# Patient Record
Sex: MALE | Race: OTHER | NOT HISPANIC OR LATINO | URBAN - METROPOLITAN AREA
[De-identification: names, ages, dates, MRNs, and addresses within clinical notes are randomized per-mention and may not be internally consistent; named-entity substitution may affect disease eponyms.]

---

## 2018-02-13 ENCOUNTER — EMERGENCY (EMERGENCY)
Facility: HOSPITAL | Age: 43
LOS: 1 days | Discharge: HOME | End: 2018-02-13
Admitting: EMERGENCY MEDICINE
Payer: MEDICAID

## 2018-02-13 VITALS
HEART RATE: 76 BPM | OXYGEN SATURATION: 100 % | TEMPERATURE: 98 F | DIASTOLIC BLOOD PRESSURE: 71 MMHG | RESPIRATION RATE: 18 BRPM | SYSTOLIC BLOOD PRESSURE: 113 MMHG

## 2018-02-13 PROCEDURE — 71046 X-RAY EXAM CHEST 2 VIEWS: CPT | Mod: 26

## 2018-02-13 PROCEDURE — 99284 EMERGENCY DEPT VISIT MOD MDM: CPT

## 2018-02-13 PROCEDURE — 73030 X-RAY EXAM OF SHOULDER: CPT | Mod: 26,RT

## 2018-02-13 RX ORDER — IBUPROFEN 200 MG
600 TABLET ORAL ONCE
Qty: 0 | Refills: 0 | Status: COMPLETED | OUTPATIENT
Start: 2018-02-13 | End: 2018-02-13

## 2018-02-13 RX ADMIN — Medication 600 MILLIGRAM(S): at 23:36

## 2018-02-13 NOTE — ED PROVIDER NOTE - OBJECTIVE STATEMENT
41 yo male c pmhx DM presents to ED c/o right shoulder pain s/p injury at work earlier today.  Pt was carrying steel with another co-worker who lost his footing and dropped the steel when he heard a "pop" in his right shoulder. Has been having a lot of pain lifting his right arm since.  Pt also notes that he has been having non-exertional intermittent chest pain and sob over the past month.  Denies any cp/sob at present, no numbness or tingling or any other joint pain or injuries.

## 2018-02-13 NOTE — ED PROVIDER NOTE - LOCATION
shoulder/TTP AC joint, no deformity, pain with abduction-unable to lift arm over head, sensation/pulses intact, no ecchymosis or erythema

## 2018-02-13 NOTE — ED PROVIDER NOTE - CARE PLAN
Principal Discharge DX:	Shoulder pain, right  Assessment and plan of treatment:	Take motrin 600mg every 8 hours as needed for pain. Avoid any strenuous activity. Keep arm in sling for comfort.  Avoid any strenuous activity. Follow up with an orthopedic within one week. Follow up with your PMD in 2 days. Return to ED for any worsening symptoms.

## 2018-02-13 NOTE — ED PROVIDER NOTE - MEDICAL DECISION MAKING DETAILS
43 yo male with right shoulder pain s/p injury earlier today-likely rotator cuff injury; will get xray right shoulder, motrin; pt also notes intermittent cp/sob x 1 month no sx at present-ekg wnl, will get cxr, recommend outpatient cardiac workup

## 2018-02-14 RX ORDER — METFORMIN HYDROCHLORIDE 850 MG/1
1 TABLET ORAL
Qty: 60 | Refills: 0
Start: 2018-02-14 | End: 2018-03-15

## 2018-10-03 ENCOUNTER — EMERGENCY (EMERGENCY)
Facility: HOSPITAL | Age: 43
LOS: 1 days | Discharge: HOME | End: 2018-10-03
Admitting: EMERGENCY MEDICINE
Payer: OTHER MISCELLANEOUS

## 2018-10-03 VITALS
DIASTOLIC BLOOD PRESSURE: 86 MMHG | RESPIRATION RATE: 18 BRPM | OXYGEN SATURATION: 98 % | HEART RATE: 88 BPM | TEMPERATURE: 98 F | SYSTOLIC BLOOD PRESSURE: 128 MMHG

## 2018-10-03 PROCEDURE — 99283 EMERGENCY DEPT VISIT LOW MDM: CPT | Mod: 25

## 2018-10-03 PROCEDURE — 99053 MED SERV 10PM-8AM 24 HR FAC: CPT

## 2018-10-03 RX ORDER — IBUPROFEN 200 MG
600 TABLET ORAL ONCE
Qty: 0 | Refills: 0 | Status: COMPLETED | OUTPATIENT
Start: 2018-10-03 | End: 2018-10-03

## 2018-10-03 RX ORDER — LIDOCAINE 4 G/100G
1 CREAM TOPICAL ONCE
Qty: 0 | Refills: 0 | Status: COMPLETED | OUTPATIENT
Start: 2018-10-03 | End: 2018-10-03

## 2018-10-03 RX ORDER — DIAZEPAM 5 MG
1 TABLET ORAL
Qty: 4 | Refills: 0
Start: 2018-10-03 | End: 2018-10-04

## 2018-10-03 RX ORDER — DIAZEPAM 5 MG
1 TABLET ORAL
Qty: 4 | Refills: 0 | OUTPATIENT
Start: 2018-10-03 | End: 2018-10-04

## 2018-10-03 RX ORDER — DIAZEPAM 5 MG
5 TABLET ORAL ONCE
Qty: 0 | Refills: 0 | Status: DISCONTINUED | OUTPATIENT
Start: 2018-10-03 | End: 2018-10-03

## 2018-10-03 RX ADMIN — Medication 600 MILLIGRAM(S): at 08:32

## 2018-10-03 RX ADMIN — Medication 5 MILLIGRAM(S): at 08:32

## 2018-10-03 RX ADMIN — LIDOCAINE 1 PATCH: 4 CREAM TOPICAL at 08:32

## 2018-10-03 NOTE — ED PROVIDER NOTE - NSTIMEPROVIDERCAREINITIATE_GEN_ER
Chief complaint: Recurrent strep.  HPI: 5-6 strep last 12 months, 4 the year before, gets rash too, snoring, OSAS, mouth breathing, positive for easy somnolence      Review of Systems:    negative    History  History reviewed. No pertinent past medical history.  History reviewed. No pertinent surgical history.  Family History   Problem Relation Age of Onset   • Migraines Mother    • Hypertension Mother    • Asthma Mother    • Asthma Brother    • Migraines Brother      Social History   Substance Use Topics   • Smoking status: Never Smoker   • Smokeless tobacco: Not on file   • Alcohol use Not on file       (Not in a hospital admission)  Allergies:  Patient has no known allergies.    Objective     Vital Signs  Wt 50.6    Physical Exam:      General Appearance:    Alert, cooperative, in no acute distress   Head:    Normocephalic, without obvious abnormality, atraumatic   Eyes:            Lids and lashes normal, conjunctivae and sclerae normal, no   icterus, no pallor, corneas clear, PERRLA   Ears:    Ears appear intact with no abnormalities noted   Nose:        Throat:   Nasal interior: normal nasal septum; Inferior turbinates-       normal; No rhinorrhea.  Normal vestibular skin. Mucosa-   normal        3+ tonsils; No oral lesions, no thrush, oral mucosa moist   Neck:   1cm NEHA nodes; supple, trachea midline, no thyromegaly, no   carotid bruit, no JVD; Thyroid- WNL         Lungs:     Clear to auscultation,respirations regular, even and                  unlabored    Heart:    Regular rhythm and normal rate, normal S1 and S2, no            murmur, no gallop, no rub, no click       Cranial Nerves:   2-12 WNL                                                    Assessment  Chronic T&A hypertrophy  OSAS    Plan  Surgical risks, benefits and procedures given.             Nathaniel Graves MD  08/17/18  1:53 PM  
03-Oct-2018 08:08

## 2018-10-03 NOTE — ED ADULT TRIAGE NOTE - CHIEF COMPLAINT QUOTE
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  Reports pain immediately after lifting bags.  Pain sharp 8/10.  Pt ambulating with difficulty and pain.  Took old percocet ~ 3am with relief. 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  Reports pain immediately after lifting bags.  Pain sharp 8/10.  Pain with ambulation.  Took old percocet ~ 3am with relief.

## 2018-10-03 NOTE — ED PROVIDER NOTE - MEDICAL DECISION MAKING DETAILS
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

## 2018-10-03 NOTE — ED PROVIDER NOTE - OBJECTIVE STATEMENT
43 year old male pmhx dm on metformin.  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  able to complete the day at work pain increased overnight.  describes pain as spasm pain, when occurs 10/10 at rest 7/10.  relief with percocet 1 tab overnight - pt had at home from prior shoulder injury.  difficulty ambulating due to spasm was able to Drive self to the hospital.  radiation to right and left lower back with some movements.  denies numbness, tingling, loss of sensation, pain down leg, loss of bowel or bladder function, headache and or neck pain

## 2018-10-03 NOTE — ED PROVIDER NOTE - PLAN OF CARE
take motrin 600mg every 6 hours as needed for pain, may use over the counter lidocaine patch.  may take valium for muscle spasm.  Do not drive while taking valium  medications. ortho referral list provided.  if symptoms persist follow up with ortho

## 2018-10-03 NOTE — ED PROVIDER NOTE - CARE PLAN
Principal Discharge DX:	Lumbar pain Principal Discharge DX:	Lumbar pain  Assessment and plan of treatment:	take motrin 600mg every 6 hours as needed for pain, may use over the counter lidocaine patch.  may take valium for muscle spasm.  Do not drive while taking valium  medications. ortho referral list provided.  if symptoms persist follow up with ortho

## 2018-10-08 ENCOUNTER — EMERGENCY (EMERGENCY)
Facility: HOSPITAL | Age: 43
LOS: 1 days | Discharge: HOME | End: 2018-10-08
Attending: EMERGENCY MEDICINE | Admitting: EMERGENCY MEDICINE
Payer: OTHER MISCELLANEOUS

## 2018-10-08 VITALS
HEART RATE: 89 BPM | DIASTOLIC BLOOD PRESSURE: 85 MMHG | RESPIRATION RATE: 18 BRPM | SYSTOLIC BLOOD PRESSURE: 134 MMHG | OXYGEN SATURATION: 100 % | TEMPERATURE: 98 F

## 2018-10-08 PROCEDURE — 99283 EMERGENCY DEPT VISIT LOW MDM: CPT | Mod: 25

## 2018-10-08 PROCEDURE — 99053 MED SERV 10PM-8AM 24 HR FAC: CPT

## 2018-10-08 RX ORDER — ACETAMINOPHEN 500 MG
650 TABLET ORAL ONCE
Qty: 0 | Refills: 0 | Status: COMPLETED | OUTPATIENT
Start: 2018-10-08 | End: 2018-10-08

## 2018-10-08 RX ORDER — KETOROLAC TROMETHAMINE 30 MG/ML
15 SYRINGE (ML) INJECTION ONCE
Qty: 0 | Refills: 0 | Status: DISCONTINUED | OUTPATIENT
Start: 2018-10-08 | End: 2018-10-08

## 2018-10-08 RX ORDER — LIDOCAINE 4 G/100G
1 CREAM TOPICAL ONCE
Qty: 0 | Refills: 0 | Status: COMPLETED | OUTPATIENT
Start: 2018-10-08 | End: 2018-10-08

## 2018-10-08 RX ADMIN — Medication 650 MILLIGRAM(S): at 07:46

## 2018-10-08 RX ADMIN — Medication 15 MILLIGRAM(S): at 07:53

## 2018-10-08 RX ADMIN — LIDOCAINE 1 PATCH: 4 CREAM TOPICAL at 07:46

## 2018-10-08 NOTE — ED PROVIDER NOTE - PHYSICAL EXAMINATION
Gen: NAD, AOx3, able to make his needs known, non-toxic //            Head: NCAT //            HEENT: EOMI, oral mucosa moist, normal conjunctiva //            Lung: CTAB, no respiratory distress, no wheezes/rhonchi/rales B/L, speaking in full sentences. //            CV: RRR, no murmurs or rubs//            Abd: soft, NTND, no guarding //            MSK: pt observed walking to his room prior, (-) straight leg, (-) cross straight leg raise, pain reproduced in R lower back at end of flexion of R hip. tender to palpation across lumbar area diffusely. no visible deformities //            Neuro: No focal sensory or motor deficits //            Skin: Warm, well perfused//            Psych: normal affect.

## 2018-10-08 NOTE — ED PROVIDER NOTE - NS ED ROS FT
GENERAL: No fever or chills, //             EYES: no change in vision, //             HEENT: no trouble swallowing or speaking, //             CARDIAC: no chest pain, //              PULMONARY: no cough or SOB, //             GI: no abdominal pain, no nausea or no vomiting, no diarrhea or constipation, //             : No changes in urination,  //            SKIN: no rashes,  //            NEURO: no headache,  //             MSK: as per HPI // otherwise as HPI or negative.

## 2018-10-08 NOTE — ED ADULT TRIAGE NOTE - CHIEF COMPLAINT QUOTE
pt c/o lower back pain, was seen in the ED a few days ago for same complaint (pain started after heavy lifting). pt states he has been taking Ibuprofen around the clock as prescribed with minimal relief.

## 2018-10-08 NOTE — ED ADULT NURSE NOTE - NSIMPLEMENTINTERV_GEN_ALL_ED
Implemented All Universal Safety Interventions:  [place] to call system. Call bell, personal items and telephone within reach. Instruct patient to call for assistance. Room bathroom lighting operational. Non-slip footwear when patient is off stretcher. Physically safe environment: no spills, clutter or unnecessary equipment. Stretcher in lowest position, wheels locked, appropriate side rails in place.

## 2018-10-08 NOTE — ED PROVIDER NOTE - MEDICAL DECISION MAKING DETAILS
42 y/o M w/ lower back pain. Will provided analgesia and information regarding follow up with Spine clinic.

## 2018-10-08 NOTE — ED PROVIDER NOTE - ATTENDING CONTRIBUTION TO CARE
42 y/o M diabetic here with low back pain.  Pt seen in ER last week with same pain after lifitng bag of cement.  Pt has been taking ibuprofen at home without relief.  Pain to low back worse on right radiating down right leg.  No fever, weakness, numbness, tingling, saddle anesthesia, bladder or bowel dysfunction.  No new injuries.  Well appearing, lying comfortably in stretcher, awake and alert, nontoxic.  VSS.  Lungs cta bl.  Cards nl S1/S2, RRR, no MRG.  Abd soft ntnd.  (+)Bilat lower lumbar paraspinal tenderness, R>L, no midline tenderness.  pos straight leg raise on right.  No pedal edema or calf tenderness.  Strength and sensation grossly full and pt is ambulatory.  Will pain control, outpatient spine follow-up.  Likely msk strain, sciatica, no red flag sxs to indicate emergent imaging at this time.

## 2018-10-08 NOTE — ED PROVIDER NOTE - OBJECTIVE STATEMENT
There are spine specialists within the [facility] system you may call [phone] for your back pain, call and arrange your follow up appointment. 42 y/o M w/ PMH of HTN and DM presenting w/ lower back pain. Pt was seen in this ED 10/5 and diagnosed w/ low back pain. No imaging was done. Received analgesia at that time which provided some relief. Pt back today because he is still experiencing pain and wants to know the cause of his symptoms. Reports initially injuring his back while lifting a heavy bag over his head at work. Localizes the pain to his lower back. Describes it as throbbing. No radiating pain at rest. Reports shooting pain down his R leg when taking steps. Is able to walk. No loss of bladder or bowel control. Pt stated his PCP has been away and he has not been able to follow up with her yet.

## 2019-01-30 ENCOUNTER — APPOINTMENT (OUTPATIENT)
Dept: ENDOCRINOLOGY | Facility: CLINIC | Age: 44
End: 2019-01-30

## 2019-02-25 ENCOUNTER — APPOINTMENT (OUTPATIENT)
Dept: ENDOCRINOLOGY | Facility: CLINIC | Age: 44
End: 2019-02-25

## 2019-08-03 NOTE — ED PROVIDER NOTE - PLAN OF CARE
Take motrin 600mg every 8 hours as needed for pain. Avoid any strenuous activity. Keep arm in sling for comfort.  Avoid any strenuous activity. Follow up with an orthopedic within one week. Follow up with your PMD in 2 days. Return to ED for any worsening symptoms. Complex Repair And O-T Advancement Flap Text: The defect edges were debeveled with a #15 scalpel blade.  The primary defect was closed partially with a complex linear closure.  Given the location of the remaining defect, shape of the defect and the proximity to free margins an O-T advancement flap was deemed most appropriate for complete closure of the defect.  Using a sterile surgical marker, an appropriate advancement flap was drawn incorporating the defect and placing the expected incisions within the relaxed skin tension lines where possible.    The area thus outlined was incised deep to adipose tissue with a #15 scalpel blade.  The skin margins were undermined to an appropriate distance in all directions utilizing iris scissors.

## 2020-11-15 ENCOUNTER — EMERGENCY (EMERGENCY)
Facility: HOSPITAL | Age: 45
LOS: 1 days | Discharge: HOME | End: 2020-11-15
Attending: STUDENT IN AN ORGANIZED HEALTH CARE EDUCATION/TRAINING PROGRAM
Payer: MEDICARE

## 2020-11-15 VITALS
OXYGEN SATURATION: 97 % | SYSTOLIC BLOOD PRESSURE: 112 MMHG | WEIGHT: 270.07 LBS | HEART RATE: 102 BPM | RESPIRATION RATE: 18 BRPM | TEMPERATURE: 99 F | HEIGHT: 75 IN | DIASTOLIC BLOOD PRESSURE: 79 MMHG

## 2020-11-15 VITALS
HEART RATE: 92 BPM | TEMPERATURE: 98 F | DIASTOLIC BLOOD PRESSURE: 94 MMHG | OXYGEN SATURATION: 98 % | RESPIRATION RATE: 18 BRPM | SYSTOLIC BLOOD PRESSURE: 123 MMHG

## 2020-11-15 LAB
ALBUMIN SERPL ELPH-MCNC: 3.9 G/DL — SIGNIFICANT CHANGE UP (ref 3.3–5)
ALP SERPL-CCNC: 94 U/L — SIGNIFICANT CHANGE UP (ref 40–120)
ALT FLD-CCNC: 17 U/L — SIGNIFICANT CHANGE UP (ref 10–45)
ANION GAP SERPL CALC-SCNC: 14 MMOL/L — SIGNIFICANT CHANGE UP (ref 5–17)
AST SERPL-CCNC: 19 U/L — SIGNIFICANT CHANGE UP (ref 10–40)
BASOPHILS # BLD AUTO: 0 K/UL — SIGNIFICANT CHANGE UP (ref 0–0.2)
BASOPHILS NFR BLD AUTO: 0 % — SIGNIFICANT CHANGE UP (ref 0–2)
BILIRUB SERPL-MCNC: 0.5 MG/DL — SIGNIFICANT CHANGE UP (ref 0.2–1.2)
BUN SERPL-MCNC: 12 MG/DL — SIGNIFICANT CHANGE UP (ref 7–23)
CALCIUM SERPL-MCNC: 9 MG/DL — SIGNIFICANT CHANGE UP (ref 8.4–10.5)
CHLORIDE SERPL-SCNC: 98 MMOL/L — SIGNIFICANT CHANGE UP (ref 96–108)
CO2 SERPL-SCNC: 21 MMOL/L — LOW (ref 22–31)
CREAT SERPL-MCNC: 0.89 MG/DL — SIGNIFICANT CHANGE UP (ref 0.5–1.3)
D DIMER BLD IA.RAPID-MCNC: 161 NG/ML DDU — SIGNIFICANT CHANGE UP
EOSINOPHIL # BLD AUTO: 0.06 K/UL — SIGNIFICANT CHANGE UP (ref 0–0.5)
EOSINOPHIL NFR BLD AUTO: 1.6 % — SIGNIFICANT CHANGE UP (ref 0–6)
GIANT PLATELETS BLD QL SMEAR: PRESENT — SIGNIFICANT CHANGE UP
GLUCOSE SERPL-MCNC: 416 MG/DL — HIGH (ref 70–99)
HCT VFR BLD CALC: 50.2 % — HIGH (ref 39–50)
HGB BLD-MCNC: 16.8 G/DL — SIGNIFICANT CHANGE UP (ref 13–17)
LYMPHOCYTES # BLD AUTO: 1.29 K/UL — SIGNIFICANT CHANGE UP (ref 1–3.3)
LYMPHOCYTES # BLD AUTO: 35 % — SIGNIFICANT CHANGE UP (ref 13–44)
MANUAL SMEAR VERIFICATION: SIGNIFICANT CHANGE UP
MCHC RBC-ENTMCNC: 28.2 PG — SIGNIFICANT CHANGE UP (ref 27–34)
MCHC RBC-ENTMCNC: 33.5 GM/DL — SIGNIFICANT CHANGE UP (ref 32–36)
MCV RBC AUTO: 84.2 FL — SIGNIFICANT CHANGE UP (ref 80–100)
MONOCYTES # BLD AUTO: 0.42 K/UL — SIGNIFICANT CHANGE UP (ref 0–0.9)
MONOCYTES NFR BLD AUTO: 11.4 % — SIGNIFICANT CHANGE UP (ref 2–14)
NEUTROPHILS # BLD AUTO: 1.62 K/UL — LOW (ref 1.8–7.4)
NEUTROPHILS NFR BLD AUTO: 43.9 % — SIGNIFICANT CHANGE UP (ref 43–77)
PLAT MORPH BLD: NORMAL — SIGNIFICANT CHANGE UP
PLATELET # BLD AUTO: 201 K/UL — SIGNIFICANT CHANGE UP (ref 150–400)
POTASSIUM SERPL-MCNC: 3.7 MMOL/L — SIGNIFICANT CHANGE UP (ref 3.5–5.3)
POTASSIUM SERPL-SCNC: 3.7 MMOL/L — SIGNIFICANT CHANGE UP (ref 3.5–5.3)
PROT SERPL-MCNC: 7.3 G/DL — SIGNIFICANT CHANGE UP (ref 6–8.3)
RBC # BLD: 5.96 M/UL — HIGH (ref 4.2–5.8)
RBC # FLD: 11.7 % — SIGNIFICANT CHANGE UP (ref 10.3–14.5)
RBC BLD AUTO: SIGNIFICANT CHANGE UP
SARS-COV-2 RNA SPEC QL NAA+PROBE: DETECTED
SODIUM SERPL-SCNC: 133 MMOL/L — LOW (ref 135–145)
TROPONIN T, HIGH SENSITIVITY RESULT: 6 NG/L — SIGNIFICANT CHANGE UP (ref 0–51)
VARIANT LYMPHS # BLD: 8.1 % — HIGH (ref 0–6)
WBC # BLD: 3.69 K/UL — LOW (ref 3.8–10.5)
WBC # FLD AUTO: 3.69 K/UL — LOW (ref 3.8–10.5)

## 2020-11-15 PROCEDURE — 84484 ASSAY OF TROPONIN QUANT: CPT

## 2020-11-15 PROCEDURE — 85025 COMPLETE CBC W/AUTO DIFF WBC: CPT

## 2020-11-15 PROCEDURE — 87635 SARS-COV-2 COVID-19 AMP PRB: CPT

## 2020-11-15 PROCEDURE — 93010 ELECTROCARDIOGRAM REPORT: CPT

## 2020-11-15 PROCEDURE — 85379 FIBRIN DEGRADATION QUANT: CPT

## 2020-11-15 PROCEDURE — 80053 COMPREHEN METABOLIC PANEL: CPT

## 2020-11-15 PROCEDURE — 71045 X-RAY EXAM CHEST 1 VIEW: CPT | Mod: 26

## 2020-11-15 PROCEDURE — 99283 EMERGENCY DEPT VISIT LOW MDM: CPT | Mod: 25

## 2020-11-15 PROCEDURE — 93005 ELECTROCARDIOGRAM TRACING: CPT

## 2020-11-15 PROCEDURE — 71045 X-RAY EXAM CHEST 1 VIEW: CPT

## 2020-11-15 PROCEDURE — 82962 GLUCOSE BLOOD TEST: CPT

## 2020-11-15 PROCEDURE — 99285 EMERGENCY DEPT VISIT HI MDM: CPT | Mod: 25

## 2020-11-15 RX ORDER — ACETAMINOPHEN 500 MG
650 TABLET ORAL ONCE
Refills: 0 | Status: COMPLETED | OUTPATIENT
Start: 2020-11-15 | End: 2020-11-15

## 2020-11-15 RX ADMIN — Medication 650 MILLIGRAM(S): at 17:18

## 2020-11-15 NOTE — ED PROVIDER NOTE - PROGRESS NOTE DETAILS
Harmeet MOODY (PGY-2): COVID+, discussed lab findings with patient, and quarantine instructions, pt verbalized understanding.

## 2020-11-15 NOTE — ED PROVIDER NOTE - CLINICAL SUMMARY MEDICAL DECISION MAKING FREE TEXT BOX
45y M w/ PMHx DM presenting with 2 weeks of myalgias, nausea, vomiting, diarrhea and intermittent fever. Patient mildly tachycardic to 104, otherwise afebrile, no focal abdominal tenderness, lungs ctab, no CVA tenderness and not currently endorsing flank pain or urinary symptoms, no current suspicion for nephrolithiasis, current symptoms consistent with viral infection, likely COVID. In setting of pleuritic chest pain and tachycardia, will obtain d-dimer, basic labs, CXR, EKG, COVID test and reassess. Nikky Bailey MD here w/ hx of DM w/ 2 weeks of body aches, n/v/d w/ intermittent fever and lethargy. Pt states he went to [facility] on tueday had labs, us and ctabdome/pelvis and was dc home w/ amox, pt didn't receive results, since then has been resting in bed w/ decreased appetite he is ambulatory in the house, he states tmax was 103F, today he felt "much better" an ddecided to come to the hospital for additional testing to find out "what happened to me at [facility]" pt states he has no flank pain, but reports that his body aches, n/v/ and fever has improved. he is requesting covid testing. he is sating  on RA, not tachypneic, no abdominal tenderness, no cva tenderness, lungs clear, 2+ radial and PT pulse, w/ no lower extremity edema, will send labs to eval for electrolyte abnormality and likely dc home w/ outpt follow up. No dysuria, no urinary freq nor urgency, no oxygen requirement

## 2020-11-15 NOTE — ED ADULT NURSE NOTE - OBJECTIVE STATEMENT
Pt is a 46 yo M who came to the ED amb c/o fever/body aches. States he developed a fever, body aches, vomiting, diarrhea and rt flank pain 10 days ago. States he was seen at [facility] 4 days ago and was started on Amoxicillin. No covid swab done. No test results given. States rt flank pain resolved spontaneously. Pt still has v/d; vomited x5 yesterday, no vomiting today but had I episode of diarrhea so far today. No cough, no cp/sob/palpitations. A/O x3.

## 2020-11-15 NOTE — ED PROVIDER NOTE - NSFOLLOWUPINSTRUCTIONS_ED_ALL_ED_FT
-You have been diagnosed with COVID    -Please continue to self-quarantine at home for your symptoms    -To control your pain at home, you should take Ibuprofen 400 mg along with Tylenol 650mg-1000mg every 6 to 8 hours. Limit your maximum daily Tylenol from all sources to 3000mg. Be aware that many other medications contain acetaminophen which is also known as Tylenol. Taking Tylenol and Ibuprofen together has been shown to be more effective at relieving pain than taking them separately. These are both over the counter medications that you can  at your local pharmacy without a prescription. You need to respect all of the warnings on the bottles. You shouldn’t take these medications for more than a week without following up with your doctor. Both medications come with certain risks and side effects that you need to discuss with your doctor, especially if you are taking them for a prolonged period.    -Continue to maintain oral hydration with plenty of fluids.       Should you develop new or worsening symptoms including but not limited to chest pain, shortness of breath, abdominal pain, fevers, vomiting, diarrhea - please return to the ED for immediate evaluation.

## 2020-11-15 NOTE — ED PROVIDER NOTE - PATIENT PORTAL LINK FT
You can access the FollowMyHealth Patient Portal offered by [facility] by registering at the following website: [email or web address]. By joining [facility]’s FollowMyHealth portal, you will also be able to view your health information using other applications (apps) compatible with our system.

## 2020-11-15 NOTE — ED PROVIDER NOTE - ATTENDING CONTRIBUTION TO CARE
see MDM see MDM     Nikky Bailey MD here w/ hx of DM w/ 2 weeks of body aches, n/v/d w/ intermittent fever and lethargy. Pt states he went to [facility] on tueday had labs, us and ctabdome/pelvis and was dc home w/ amox, pt didn't receive results, since then has been resting in bed w/ decreased appetite he is ambulatory in the house, he states tmax was 103F, today he felt "much better" an ddecided to come to the hospital for additional testing to find out "what happened to me at [facility]" pt states he has no flank pain, but reports that his body aches, n/v/ and fever has improved. he is requesting covid testing. he is sating  on RA, not tachypneic, no abdominal tenderness, no cva tenderness, lungs clear, 2+ radial and PT pulse, w/ no lower extremity edema, will send labs to eval for electrolyte abnormality and likely dc home w/ outpt follow up. No dysuria, no urinary freq nor urgency, no oxygen requirement

## 2020-11-15 NOTE — ED PROVIDER NOTE - PHYSICAL EXAMINATION
Physical Exam:  Gen: NAD, AOx3, non-toxic appearing, able to ambulate without assistance  HEENT: EOMI, PEERL, normal conjunctiva, tongue midline, oral mucosa moist  Lung: CTAB, no respiratory distress, no wheezes/rhonchi/rales B/L, speaking in full sentences  CV: RRR, no murmurs, rubs or gallops  Abd: soft, mild diffuse tenderness, no focal tenderness, ND, no guarding, no rigidity, no rebound tenderness, no CVA tenderness   MSK: no visible deformities, ROM normal in UE/LE, no back pain  Neuro: No focal sensory or motor deficits  Skin: Warm, well perfused, no rash, no leg swelling  Psych: normal affect, calm  Mariposa Ga D.O. -Resident

## 2020-11-15 NOTE — ED ADULT NURSE NOTE - NSHOSCREENINGQ1_ED_ALL_ED
CM met with Pt per her request to discuss a lack of income due to hospitalization  CM explored with Pt, disability vs unemployment  CM will continue to follow  No

## 2020-11-15 NOTE — ED PROVIDER NOTE - NS ED ROS FT
CONSTITUTIONAL: + fevers, +myalgias, no lightheadedness, no dizziness  NOSE: + nasal congestion  MOUTH/THROAT: no sore throat  CV: +pleuritic chest pain, no palpitations  RESP: + SOB, no cough  GI: +n/v/d, no abdominal pain   : no dysuria, no hematuria, no flank pain  MSK: no back pain, no extremity pain  SKIN: no rashes  NEURO: no headache, no focal weakness, no decreased sensation/paresthesias   PSYCHIATRIC: no known mental health issues

## 2020-11-15 NOTE — ED ADULT NURSE NOTE - CHPI ED NUR SYMPTOMS NEG
no abdominal distension no abdominal distension/no blood in stool/no burning urination/no dysuria/no hematuria/no chills

## 2020-11-15 NOTE — ED PROVIDER NOTE - OBJECTIVE STATEMENT
45y M w/ PMHx DM presenting with 2 weeks of myalgias, nausea, vomiting, diarrhea and intermittent fever. Patient also endorses 4 days of R flank pain that has since resolved since Tuesday. Patient states he went to [facility] on Tuesday for his symptoms, states he had an US and CT Abd/Pelvis at the time, was give amoxacillin and discharged home, states he did not receive the results of his imaging study on day of discharge, was told they would be mailed to him. States he was then called on Thursday to return to the ED for repeat CXR as he was told "they wanted to make sure I didn't have a pulmonary nodule vs. nipple shadow". Patient endorses 5 episodes of loose stool a day, decreased appetite with nausea, but no recent episodes of vomiting. Patient states today he feels "much better than he has all week". Last fever was Tuesday as reported by patient to 103F. Currently denies flank pain, hematuria, dysuria. Patient also endorsing mild pleuritic chest pain and sob, but denies exacerbation of his sob with ambulation. Patient states he was not tested for COVID at his last ED visit at [facility] on Tuesday. Denies headache, dizziness, visual changes, hx of blood clots, states he is currently tolerating food well.

## 2020-11-15 NOTE — ED ADULT NURSE NOTE - CHIEF COMPLAINT QUOTE
pain to R flank and R side abd  103F earlier this week, not at this time, given amoxicillin at another hospital but not given a diagnosis  hyperglycemia, mild SOB

## 2020-11-15 NOTE — ED ADULT TRIAGE NOTE - CHIEF COMPLAINT QUOTE
pain to R flank and R side abd  103F earlier this week  hyperglycemia pain to R flank and R side abd  103F earlier this week, mild SOB  hyperglycemia pain to R flank and R side abd  103F earlier this week, not at this time, mild SOB  hyperglycemia pain to R flank and R side abd  103F earlier this week, not at this time, given amoxicillin at another hospital but not given a diagnosis  hyperglycemia, mild SOB

## 2020-11-16 ENCOUNTER — TRANSCRIPTION ENCOUNTER (OUTPATIENT)
Age: 45
End: 2020-11-16

## 2020-11-17 ENCOUNTER — TRANSCRIPTION ENCOUNTER (OUTPATIENT)
Age: 45
End: 2020-11-17

## 2020-11-18 ENCOUNTER — TRANSCRIPTION ENCOUNTER (OUTPATIENT)
Age: 45
End: 2020-11-18

## 2020-11-20 ENCOUNTER — TRANSCRIPTION ENCOUNTER (OUTPATIENT)
Age: 45
End: 2020-11-20

## 2020-11-25 ENCOUNTER — TRANSCRIPTION ENCOUNTER (OUTPATIENT)
Age: 45
End: 2020-11-25

## 2020-12-14 ENCOUNTER — TRANSCRIPTION ENCOUNTER (OUTPATIENT)
Age: 45
End: 2020-12-14

## 2020-12-16 ENCOUNTER — APPOINTMENT (OUTPATIENT)
Dept: INTERNAL MEDICINE | Facility: CLINIC | Age: 45
End: 2020-12-16

## 2020-12-31 ENCOUNTER — NON-APPOINTMENT (OUTPATIENT)
Age: 45
End: 2020-12-31

## 2023-06-18 NOTE — ED ADULT TRIAGE NOTE - NSTRIAGECARE_GEN_A_ER
Face Mask/finger stick
PROCEDURES:  Diagnostic laparoscopy 18-Jun-2023 09:53:12  Андрей Donato  [place], abdominal cavity, laparoscopic 18-Jun-2023 09:53:22  Андрей Donato

## 2023-07-22 ENCOUNTER — EMERGENCY (EMERGENCY)
Facility: HOSPITAL | Age: 48
LOS: 1 days | Discharge: HOME | End: 2023-07-22
Admitting: EMERGENCY MEDICINE
Payer: SELF-PAY

## 2023-07-22 VITALS
HEART RATE: 88 BPM | DIASTOLIC BLOOD PRESSURE: 80 MMHG | OXYGEN SATURATION: 98 % | TEMPERATURE: 99 F | RESPIRATION RATE: 17 BRPM | SYSTOLIC BLOOD PRESSURE: 135 MMHG

## 2023-07-22 VITALS
TEMPERATURE: 98 F | DIASTOLIC BLOOD PRESSURE: 82 MMHG | RESPIRATION RATE: 18 BRPM | OXYGEN SATURATION: 98 % | HEART RATE: 92 BPM | SYSTOLIC BLOOD PRESSURE: 138 MMHG

## 2023-07-22 PROCEDURE — 99284 EMERGENCY DEPT VISIT MOD MDM: CPT

## 2023-07-22 PROCEDURE — 99283 EMERGENCY DEPT VISIT LOW MDM: CPT

## 2023-07-22 RX ORDER — IBUPROFEN 200 MG
600 TABLET ORAL ONCE
Refills: 0 | Status: COMPLETED | OUTPATIENT
Start: 2023-07-22 | End: 2023-07-22

## 2023-07-22 RX ADMIN — Medication 600 MILLIGRAM(S): at 14:56

## 2023-07-22 NOTE — ED ADULT TRIAGE NOTE - CHIEF COMPLAINT QUOTE
49 y/o male c/o hand injury, after heavy object dropped on his left hand and wrist. Pt sent from urgent care for further imaging.

## 2023-07-22 NOTE — ED PROVIDER NOTE - PATIENT PORTAL LINK FT
You can access the FollowMyHealth Patient Portal offered by [facility] by registering at the following website: [email or web address]. By joining [facility]’s FollowMyHealth portal, you will also be able to view your health information using other applications (apps) compatible with our system.

## 2023-07-22 NOTE — ED PROVIDER NOTE - CARE PROVIDER_API CALL
Anthony Armenta  Surgery of the Hand  [address]  [place] [address]  Phone: [phone]  Fax: [phone]  Follow Up Time:

## 2023-07-22 NOTE — ED PROVIDER NOTE - CLINICAL SUMMARY MEDICAL DECISION MAKING FREE TEXT BOX
47 y/o male w/ hx dm p/w L 3rd and 4th digit pain s/p crush injury that occurred a few hrs prior to arrival to ED.  Works as , dropped train rail onto 3rd/ 4th PIP joints on L hand.  Went to , had XRs and advised to come to ED for further eval.  Notes numbness sensation, but states was given digital block prior to arrival to ED.  Denies other injury/ trauma.  Tdap UTD.  Pt is R hand dominant.  Exam notable for +swelling/ ecchymosis/ ttp to L 3rd/ 4th PIP joints (3rd>4th).  Small abrasion/ scrape to L 3rd PIP joint.  Pt with FROM to fingers, just with pain.   5/5 strength b/l upper ext.  Slight decreased sensation to 3rd/ 4th finger tips (though unclear if occurred before/ after digital block)  XR uploaded - reviewed, appears to have ? foreign body at 3rd DIP joint (though, no wound there - so suspect old/ unrelated to injury today?), no obvious fx, +subq air  D/w Dr. King, on call for hand, and sent images to him for review.  Recs splint fingers straight, no other imaging recommended today.  Recs outpt f/u with hand  Will provide pain control.  Splint and F/u hand outpt  DC with strict return precautions

## 2023-07-22 NOTE — ED ADULT NURSE NOTE - OBJECTIVE STATEMENT
Patient came to ER after having a railing fall on his left hand, patient was splinted at urgent care and told to come to ER to see ortho and get checked for "tendon issues". patient noted to have swelling to left hand and fingers, has + sensation but unable to move fingers without pain.

## 2023-07-22 NOTE — ED PROVIDER NOTE - OBJECTIVE STATEMENT
49 y/o male w/ hx dm p/w L 3rd and 4th digit pain s/p crush injury that occurred a few hrs prior to arrival to ED.  Works as , dropped train rail onto 3rd/ 4th PIP joints on L hand.  Went to , had XRs and advised to come to ED for further eval.  Notes numbness sensation, but states was given digital block prior to arrival to ED.  Denies other injury/ trauma.  Tdap UTD.  Pt is R hand dominant.

## 2023-07-22 NOTE — ED PROVIDER NOTE - NSFOLLOWUPINSTRUCTIONS_ED_ALL_ED_FT
Thank you for visiting [facility] Emergency Department.      We saw you today for a hand injury.    PAIN CONTROL:   You may take ibuprofen (Motrin, Advil) 600 mg (3 regular tablets) every 6 hours as needed for pain.  Please take with food.  Stop taking if you develop abdominal pain, dark/ bloody stools.  Do not mix with other NSAIDS (ie. Naproxen, Aleve, Celecoxib).  You may also take acetaminophen (Tylenol) 650-975mg (2-3 regular tablets) or 500-1000mg (1-2 extra strength tablets) every 6 hours as needed for pain.  Do not exceed 4000 mg in 1 day. These medications may be bought over the counter.    I recommend alternating the Ibuprofen and Tylenol so you are getting medications around the clock.  For example take the Ibuprofen, then 3 hours later take the Tylenol, then 3 hours later take the Ibuprofen, and repeat as needed.    Rest. Apply ice to affected area 20 minutes on, then 20 minutes off.  You may repeat throughout the day.  Please wear splint at all times as instructed. Elevate affected extremity.    Please follow up with a hand specialist in 1 week for re-evaluation.   Please know that no emergency visit is complete without follow-up with your primary care provider in 1 week.  Please bring copies of all discharge papers and results and show to your doctor.      Please continue taking all previous medications as instructed unless we discussed otherwise.     I appreciated your patience and hope you feel better soon.     Return to ER immediately if you develop fevers, chills, chest pain, shortness of breath, worsening and/or any concerning symptoms.

## 2023-07-22 NOTE — ED PROVIDER NOTE - PHYSICAL EXAMINATION
CONSTITUTIONAL: Awake, alert.  Nontoxic, no acute distress.    HEAD: Normocephalic, atraumatic.    MUSCULOSKELETAL: +swelling/ ecchymosis/ ttp to L 3rd/ 4th PIP joints (3rd>4th).  Small abrasion/ scrape to L 3rd PIP joint.  Pt with FROM to fingers, just with pain.   5/5 strength b/l upper ext.  Slight decreased sensation to 3rd/ 4th finger tips.  motor function grossly intact.  Strong equal peripheral pulses b/l.   Cap refill < 2 b/l upper and lower ext.  All compartments soft.  HAND no snuffbox tenderness, radial, median, and ulnar nerve testing intact.

## 2023-07-24 DIAGNOSIS — S60.042A CONTUSION OF LEFT RING FINGER WITHOUT DAMAGE TO NAIL, INITIAL ENCOUNTER: ICD-10-CM
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
## 2023-07-24 DIAGNOSIS — S60.032A CONTUSION OF LEFT MIDDLE FINGER WITHOUT DAMAGE TO NAIL, INITIAL ENCOUNTER: ICD-10-CM
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
## 2023-07-24 DIAGNOSIS — E11.9 TYPE 2 DIABETES MELLITUS WITHOUT COMPLICATIONS: ICD-10-CM
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
## 2023-07-24 DIAGNOSIS — M79.645 PAIN IN LEFT FINGER(S): ICD-10-CM

## 2023-11-09 NOTE — ED ADULT TRIAGE NOTE - CHIEF COMPLAINT QUOTE
Pt c/o chest pain starting approx 1 month ago.  Pt endorses intermittent SOB with chest pain but denies any palpitations.  Pt also c/o R shoulder injury today at work.  Pt states was hit with a piece of steel and felt something pop.  ROM minimal.  +radial pulse.  PMHx:  DM Pt c/o chest pain starting approx 1 month ago.  Pt endorses intermittent SOB with chest pain but denies any palpitations.  Pt also c/o R shoulder injury today at work.  Pt states was hit with a piece of steel and "felt something pop".  Some ROM, no deformity noted.  +radial pulse.  PMHx:  DM 18
